# Patient Record
Sex: FEMALE | URBAN - METROPOLITAN AREA
[De-identification: names, ages, dates, MRNs, and addresses within clinical notes are randomized per-mention and may not be internally consistent; named-entity substitution may affect disease eponyms.]

---

## 2024-10-15 ENCOUNTER — APPOINTMENT (RX ONLY)
Dept: URBAN - METROPOLITAN AREA CLINIC 356 | Facility: CLINIC | Age: 59
Setting detail: DERMATOLOGY
End: 2024-10-15

## 2024-10-15 DIAGNOSIS — Z41.9 ENCOUNTER FOR PROCEDURE FOR PURPOSES OTHER THAN REMEDYING HEALTH STATE, UNSPECIFIED: ICD-10-CM

## 2024-10-15 PROCEDURE — ? DYSPORT

## 2024-10-15 PROCEDURE — ? ADDITIONAL NOTES

## 2024-10-15 PROCEDURE — ? COSMETIC CONSULTATION: FILLERS

## 2024-10-15 NOTE — PROCEDURE: ADDITIONAL NOTES
Additional Notes: Recommended 1-2 syringes of Restylane Defyne and 1-2 syringes of Restylane Contour.
Detail Level: Detailed
Render Risk Assessment In Note?: no

## 2024-10-15 NOTE — PROCEDURE: DYSPORT
Show Additional Area 2: Yes
Mentalis Units: 0
Lot #: [id number]
Show Lcl Units: No
Dilution (U/0.1 Cc): 10
Glabellar Complex Units: 60
Expiration Date (Month Year): 4/30/26
Forehead Units: 24
Price (Use Numbers Only, No Special Characters Or $): 263
Consent: Written consent obtained. Risks include but not limited to lid/brow ptosis, bruising, swelling, diplopia, temporary effect, incomplete chemical denervation.
Detail Level: Detailed
Post-Care Instructions: Patient instructed to not lie down for 4 hours and limit physical activity for 24 hours.

## 2024-10-29 ENCOUNTER — APPOINTMENT (RX ONLY)
Dept: URBAN - METROPOLITAN AREA CLINIC 356 | Facility: CLINIC | Age: 59
Setting detail: DERMATOLOGY
End: 2024-10-29

## 2024-10-29 DIAGNOSIS — Z41.9 ENCOUNTER FOR PROCEDURE FOR PURPOSES OTHER THAN REMEDYING HEALTH STATE, UNSPECIFIED: ICD-10-CM

## 2024-10-29 PROCEDURE — ? RESTYLANE DEFYNE INJECTION

## 2024-10-29 PROCEDURE — ? RESTYLANE INJECTION

## 2024-10-29 NOTE — PROCEDURE: RESTYLANE DEFYNE INJECTION
Anesthesia Type: 1% lidocaine with epinephrine
Lateral Face Filler Volume In Cc: 0
Use Map Statement For Sites (Optional): No
Number Of Syringes (Required For Inventory): 1
Additional Anesthesia Volume In Cc: 6
Procedural Text: The filler was administered to the treatment areas noted above.
Consent: Written consent obtained. Risks include but not limited to bruising, beading, irregular texture, ulceration, infection, allergic reaction, scar formation, incomplete augmentation, temporary nature, procedural pain.
Marionette Lines Filler Volume In Cc: 0.5
Lot #: [id number]
Post-Care Instructions: Patient instructed to apply ice to reduce swelling.
Detail Level: Detailed
Map Statement: See Attached Map for Complete Details
Filler: Restylane Defyne
Expiration Date (Month Year): 9/30/25
Price (Use Numbers Only, No Special Characters Or $): 909

## 2024-10-29 NOTE — PROCEDURE: RESTYLANE INJECTION
Brows Filler Volume In Cc: 0
Consent: Written consent obtained. Risks include but not limited to bruising, beading, irregular texture, ulceration, infection, allergic reaction, scar formation, incomplete augmentation, temporary nature, procedural pain.
Procedural Text: The filler was administered to the treatment areas noted above.
Detail Level: Detailed
Expiration Date (Month Year): 10/31/25
Anesthesia Volume In Cc: 0.5
Mid Face Filler Volume In Cc: 2
Map Statement: See Attached Map for Complete Details
Include Cannula Information In Note?: No
Post-Care Instructions: Patient instructed to apply ice to reduce swelling.
Filler: Restylane Contour
Price (Use Numbers Only, No Special Characters Or $): 1600
Additional Area 1 Location: perioral lips
Lot #: [id number]
Anesthesia Type: 1% lidocaine with epinephrine
Additional Anesthesia Volume In Cc: 6

## 2024-10-29 NOTE — HPI: COSMETIC FOLLOW UP
How Did You Tolerate The Procedure?: well, without problems
What Condition Are We Treating?: Wrinkles
What Procedure Did We Perform At The Last Visit?: Dysport follow

## 2024-11-12 ENCOUNTER — APPOINTMENT (RX ONLY)
Dept: URBAN - METROPOLITAN AREA CLINIC 356 | Facility: CLINIC | Age: 59
Setting detail: DERMATOLOGY
End: 2024-11-12

## 2024-11-12 DIAGNOSIS — Z41.9 ENCOUNTER FOR PROCEDURE FOR PURPOSES OTHER THAN REMEDYING HEALTH STATE, UNSPECIFIED: ICD-10-CM

## 2024-11-12 PROCEDURE — ? COSMETIC FOLLOW-UP

## 2024-11-12 NOTE — HPI: FOLLOW UP FILLERS
[id number]: previous_has_had_prior_Filler
When Was Your Last Filler Injection?: 10/24
Additional History: Follow up filler

## 2024-11-12 NOTE — PROCEDURE: COSMETIC FOLLOW-UP
Price (Use Numbers Only, No Special Characters Or $): 0
Detail Level: Zone
Comments (Free Text): Patient satisfied with natural results.  Will consider Sculptra next time.